# Patient Record
(demographics unavailable — no encounter records)

---

## 2018-12-18 NOTE — 2DMMODE
[place] [address]
Phone:  [phone] 2 D/M-MODE ECHOCARDIOGRAM     
_______________________________________________________________________________
 
Name:         VANI SIFUENTES                Room:                     REG CLI
M.R.#:    [id number]     Account #:     [id number]  
Admission:    18    Attend Phys:   Prosper Bustillo MD
Discharge:                Date of Birth: 41  
Date of Service: 18 1719  Report #:      [id number]
        [id number]
_______________________________________________________________________________
THIS REPORT FOR:  //name//                      
 
 
--------------- APPROVED REPORT --------------
 
 
Study performed:  2018 08:03:32
 
EXAM: Comprehensive 2D, Doppler, and color-flow 
Echocardiogram 
Patient Location: Out-Patient   
      Status:  routine
 
     BSA:         2.09
HR: 78 bpm BP:          92/69 mmHg 
 
Other Information 
Study Quality: Good
 
Indications
Murmur
Aortic valve replacement
 
2D Dimensions
IVSd:  13.62 (7-11mm) LVOT Diam:  20.36 (18-24mm) 
LVDd:  48.24 mm  
PWd:  13.69 (7-11mm) Ascending Ao:  34.48 (22-36mm)
LVDs:  34.39 (25-40mm) 
Aortic Root:  23.25 mm 
 
Volumes
Left Atrial Volume (Systole) 
    LA ESV Index:  30.60 mL/m2
 
Aortic Valve
AoV Peak Ld.:  1.95 m/s 
AO Peak Gr.:  15.17 mmHg LVOT Max P.81 mmHg
AO Mean Gr.:  8.51 mmHg  LVOT Mean P.05 mmHg
    LVOT Max V:  1.10 m/s
AO V2 VTI:  37.71 cm  LVOT Mean V:  0.64 m/s
RON (VTI):  1.89 cm2  LVOT V1 VTI:  21.84 cm
 
Mitral Valve
MV Peak Gr.:  13.09 mmHg 
MV Mean Gr.:  6.39 mmHg  E/A Ratio:  1.13
    MV Decel. Time:  310.79 ms
 
 
[place] [address]
Phone:  [phone]                     2 D/M-MODE ECHOCARDIOGRAM     
_______________________________________________________________________________
 
Name:         VANI SIFUENTES                Room:                     REG CLI
M.R.#:    [id number]     Account #:     [id number]  
Admission:    18    Attend Phys:   Prosper Bustillo MD
Discharge:                Date of Birth: 41  
Date of Service: 18 1719  Report #:      [id number]
        [id number]
_______________________________________________________________________________
MV E Max Ld.:  1.33 m/s 
MV PHT:  90.13 ms  
MVA (PHT):  2.44 cm2  
 
TDI
E/Lateral E':  26.60 E/Medial E':  33.25
   Medial E' Ld.:  0.04 m/s
   Lateral E' Ld.:  0.05 m/s
 
Pulmonary Valve
PV Peak Ld.:  1.08 m/s PV Peak Gr.:  4.66 mmHg
 
Tricuspid Valve
    RAP Estimate:  5.00 mmHg
TR Peak Gr.:  36.95 mmHg RVSP:  41.95 mmHg
    PA Pressure:  41.95 mmHg
 
Left Ventricle
The left ventricle is normal size. There is normal LV segmental wall 
motion. Mild concentric left ventricular hypertrophy. Left 
ventricular systolic function is normal. The left ventricular 
ejection fraction is within the normal range. LVEF is 50-55%. Grade I 
- abnormal relaxation pattern.
 
Right Ventricle
The right ventricle is normal size. The right ventricular systolic 
function is normal. Pacemaker lead is present in the right ventricle. 
 
Atria
The left atrium size is normal. Pacemaker lead is present in the 
right atrium.
 
Aortic Valve
Mild aortic valve sclerosis. Bioprosthetic aortic valve is present. 
No aortic regurgitation is present. No hemodynamically significant 
valvular aortic stenosis.
 
Mitral Valve
Severe mitral annular calcification. Mitral valve leaflets are 
thickened. Mild mitral regurgitation. No significant mitral valve 
stenosis.
 
Tricuspid Valve
The tricuspid valve is normal in structure. Mild to moderate 
tricuspid regurgitation.
 
 
 
[place] [address]
Phone:  [phone]                     2 D/M-MODE ECHOCARDIOGRAM     
_______________________________________________________________________________
 
Name:         VANI SIFUENTES                Room:                     REG CLI
M.R.#:    [id number]     Account #:     [id number]  
Admission:    18    Attend Phys:   Prosper Bustillo MD
Discharge:                Date of Birth: 41  
Date of Service: 18 1719  Report #:      [id number]
        [id number]
_______________________________________________________________________________
Pulmonic Valve
The pulmonary valve is normal in structure. There is no pulmonic 
valvular regurgitation.
 
Great Vessels
The aortic root is normal in size. IVC is normal in size and 
collapses &gt;50% with inspiration.
 
Pericardium
There is no pericardial effusion.
 
&lt;Conclusion&gt;
The left ventricle is normal size.
Mild concentric left ventricular hypertrophy.
Left ventricular systolic function is normal.
The left ventricular ejection fraction is within the normal 
range.
LVEF is 50-55%.
Grade I - abnormal relaxation pattern.
The right ventricle is normal size.
The left atrium size is normal.
Mild aortic valve sclerosis.
No aortic regurgitation is present.
No hemodynamically significant valvular aortic stenosis.
Severe mitral annular calcification.
Mitral valve leaflets are thickened.
Mild mitral regurgitation.
No significant mitral valve stenosis.
The tricuspid valve is normal in structure.
Mild to moderate tricuspid regurgitation.
IVC is normal in size and collapses &gt;50% with inspiration.
There is no pericardial effusion.
There is normal LV segmental wall motion.
Bioprosthetic aortic valve is present.
 
 
 
 
 
 
 
 
 
 
  <ELECTRONICALLY SIGNED>
                                           By: Fabian Saenz MD, FACC     
  18
D: 18   _____________________________________
T: 18   Fabian Saenz MD, FACC       /INF

## 2020-10-23 NOTE — EKG
[place] [address]
Phone:  [phone]                     ELECTROCARDIOGRAM REPORT      
_______________________________________________________________________________
 
Name:         VANI SIFUENTES                Room:                     REG ER 
M.R.#:    [id number]     Account #:     [id number]  
Admission:    10/23/20    Attend Phys:                     
Discharge:                Date of Birth: 41  
Date of Service: 10/23/20 1503  Report #:      [id number]
        [id number]
_______________________________________________________________________________
THIS REPORT FOR:  //name//                      
 
                         [facility] ED
                                       
Test Date:    2020-10-23               Test Time:    15:03:46
Pat Name:     VANI SIFUENTES             Department:   
Patient ID:   [id number]            Room:          
Gender:                               Technician:   [facility]
:          1941               Requested By: Mirela Espino
Order Number: [id number]    Theo MD:   Fabian Saenz
                                 Measurements
Intervals                              Axis          
Rate:         104                      P:            106
IA:           150                      QRS:          -77
QRSD:         166                      T:            85
QT:           392                                    
QTc:          516                                    
                           Interpretive Statements
Ventricular-paced complexes
No further analysis attempted due to paced rhythm
Baseline wander in lead(s) II,III,aVF
Compared to ECG 2017 11:26:04
Atrial-sensed ventricular-paced complex(es persist
Electronically Signed On 10- 16:47:23 CDT by Fabian Saenz
[email or web address]
 
 
 
 
 
 
 
 
 
 
 
 
 
 
 
 
 
 
 
  <ELECTRONICALLY SIGNED>
                                           By: Fabian Saenz MD, [facility]     
  10/23/20     1647
D: 10/23/20 1503   _____________________________________
T: 10/23/20 1503   Fabian Saenz MD, [facility]       /EPI

## 2020-10-28 NOTE — 2DMMODE
[place] [address]
Phone:  [phone] 2 D/M-MODE ECHOCARDIOGRAM     
_______________________________________________________________________________
 
Name:         VANI SIFUENTES                Room:          [address] [place] 
.R.#:    [id number]     Account #:     [id number]  
Admission:    10/23/20    Attend Phys:   Jazmín Vergara, 
Discharge:                Date of Birth: 41  
Date of Service: 10/28/20 1329  Report #:      [id number]
        [id number]
_______________________________________________________________________________
THIS REPORT FOR:
 
cc:  Michael Silvestre MD, Bruce D. MD Holkins,Fabian HERNÁNDEZ MD [facility]       
                                                                       ~
 
--------------- APPROVED REPORT --------------
 
 
Study performed:  10/28/2020 10:03:01
 
EXAM: Comprehensive 2D, Doppler, and color-flow 
Echocardiogram 
Patient Location: Bedside   
 
      BSA:         2.11
HR: 74 bpm BP:          167/83 mmHg 
 
Other Information 
Study Quality: Adequate
 
Indications
Congestive Heart Failure
Covid Positive
 
2D Dimensions
IVSd:  17.38 (7-11mm) LVOT Diam:  16.48 (18-24mm) 
LVDd:  54.31 mm  
PWd:  13.32 (7-11mm) Ascending Ao:  31.35 (22-36mm)
LVDs:  36.80 (25-40mm) 
Aortic Root:  33.67 mm 
 
Volumes
Left Atrial Volume (Systole) 
    LA ESV Index:  79.40 mL/m2
 
Aortic Valve
AoV Peak Ld.:  1.87 m/s 
AO Peak Gr.:  13.95 mmHg LVOT Max P.49 mmHg
AO Mean Gr.:  8.01 mmHg  LVOT Mean PG:  3.33 mmHg
    LVOT Max V:  1.27 m/s
AO V2 VTI:  35.13 cm  LVOT Mean V:  0.84 m/s
RON (VTI):  1.52 cm2  LVOT V1 VTI:  25.09 cm
 
Mitral Valve
 
 
[place] [address]
Phone:  [phone]                     2 D/M-MODE ECHOCARDIOGRAM     
_______________________________________________________________________________
 
Name:         VANI SIFUENTES                Room:          [address] [place] 
..#:    [id number]     Account #:     [id number]  
Admission:    10/23/20    Attend Phys:   Jazmín Vergara, 
Discharge:                Date of Birth: 41  
Date of Service: 10/28/20 1329  Report #:      [id number]
        [id number]
_______________________________________________________________________________
MV Mean Gr.:  4.90 mmHg  E/A Ratio:  1.39
    MV Decel. Time:  277.64 ms
MV E Max Ld.:  1.70 m/s 
MV PHT:  80.52 ms  
MVA (PHT):  2.73 cm2  
 
Pulmonary Valve
PV Peak Ld.:  1.01 m/s PV Peak Gr.:  4.07 mmHg
 
Tricuspid Valve
    RAP Estimate:  5.00 mmHg
TR Peak Gr.:  36.75 mmHg RVSP:  41.75 mmHg
    PA Pressure:  41.75 mmHg
 
Left Ventricle
The left ventricle is normal size. There is normal LV segmental wall 
motion. Mild concentric left ventricular hypertrophy. Left 
ventricular systolic function is normal. The left ventricular 
ejection fraction is within the normal range. LVEF is 60-65%. The 
left ventricular diastolic function is normal.
 
Right Ventricle
The right ventricle is normal size. The right ventricular systolic 
function is normal. Pacemaker lead is present in the right ventricle. 
 
Atria
Left atrium is moderately dilated. The right atrium size is 
normal.
 
Aortic Valve
Mild aortic valve sclerosis. Bioprosthetic aortic valve is present. 
No aortic regurgitation is present. No hemodynamically significant 
valvular aortic stenosis.
 
Mitral Valve
Severe mitral annular calcification. Trace mitral regurgitation. No 
evidence of mitral valve stenosis.
 
Tricuspid Valve
The tricuspid valve is normal in structure. Trace tricuspid 
regurgitation.
 
Pulmonic Valve
The pulmonary valve is normal in structure. There is no pulmonic 
valvular regurgitation.
 
 
 
[place] [address]
Phone:  [phone]                     2 D/M-MODE ECHOCARDIOGRAM     
_______________________________________________________________________________
 
Name:         VANI SIFUENTES                Room:          [address]#:    [id number]     Account #:     [id number]  
Admission:    10/23/20    Attend Phys:   Jazmín Vergara, 
Discharge:                Date of Birth: 41  
Date of Service: 10/28/20 1329  Report #:      [id number]
        [id number]
_______________________________________________________________________________
Great Vessels
The aortic root is normal in size. Aortic arch is not visualized. IVC 
is notl visualized.
 
Pericardium
There is no pericardial effusion.
 
<Conclusion>
The left ventricle is normal size.
Mild concentric left ventricular hypertrophy.
Left ventricular systolic function is normal.
The left ventricular ejection fraction is within the normal 
range.
LVEF is 60-65%.
The left ventricular diastolic function is normal.
The right ventricle is normal size.
Left atrium is moderately dilated.
Mild aortic valve sclerosis.
No aortic regurgitation is present.
No hemodynamically significant valvular aortic stenosis.
Severe mitral annular calcification.
Trace mitral regurgitation.
No evidence of mitral valve stenosis.
The tricuspid valve is normal in structure.
There is no pericardial effusion.
There is normal LV segmental wall motion.
Bioprosthetic aortic valve is present.
 
 
 
 
 
 
 
 
 
 
 
 
 
 
 
 
 
  <ELECTRONICALLY SIGNED>
                                           By: Fabian Saenz MD, FACC     
  10/28/20     1329
D: 10/28/20 1329   _____________________________________
T: 10/28/20 1329   Fabian Saenz MD, FACC       /INF

## 2021-05-31 NOTE — EKG
[place] [address]
Phone:  [phone]                     ELECTROCARDIOGRAM REPORT      
_______________________________________________________________________________
 
Name:         ESAU SIFUENTESCAMILA MAYO                Room:                     [facility]#:    [id number]     Account #:     [id number]  
Admission:    21    Attend Phys:                     
Discharge:    21    Date of Birth: 41  
Date of Service: 21 1511  Report #:      [id number]
        [id number]
_______________________________________________________________________________
THIS REPORT FOR:  //name//                      
 
                         [facility] ED
                                       
Test Date:    2021               Test Time:    15:11:05
Pat Name:     VANI SIFUENTES             Department:   
Patient ID:   [id number]            Room:          
Gender:                               Technician:   
:          1941               Requested By: Francis Rosario
Order Number: [id number] MD:   Prosper Bustillo
                                 Measurements
Intervals                              Axis          
Rate:         76                       P:            58
UT:           171                      QRS:          -80
QRSD:         166                      T:            90
QT:           431                                    
QTc:          485                                    
                           Interpretive Statements
Atrial-sensed ventricular-paced rhythm
No further analysis attempted due to paced rhythm
Baseline wander in lead(s) V1,V4
Compared to ECG 10/23/2020 15:03:46
No significant changes
Electronically Signed On 2021 10:55:58 CDT by Prosper Bustillo
[email or web address]
 
 
 
 
 
 
 
 
 
 
 
 
 
 
 
 
 
 
 
  <ELECTRONICALLY SIGNED>
                                           By: Prosper Bustillo MD, [facility]      
  21     1055
D: 21 151   _____________________________________
T: 21 151   Prosper Bustillo MD, [facility]        /EPI

## 2021-08-05 NOTE — EKG
[place] [address]
Phone:  [phone]                     ELECTROCARDIOGRAM REPORT      
_______________________________________________________________________________
 
Name:         ESAU SIFUENTESCAMILA MAYO                Room:                     [facility]#:    [id number]     Account #:     [id number]  
Admission:    21    Attend Phys:                     
Discharge:    21    Date of Birth: 41  
Date of Service: 21 0745  Report #:      [id number]
        [id number]
_______________________________________________________________________________
THIS REPORT FOR:  //name//                      
 
                         [facility] ED
                                       
Test Date:    2021               Test Time:    07:45:51
Pat Name:     VANI SIFUENTES             Department:   
Patient ID:   [id number]            Room:          
Gender:                               Technician:   AL
:          1941               Requested By: Francis Rosario
Order Number: [id number] MD:   Jos Gabriel
                                 Measurements
Intervals                              Axis          
Rate:         85                       P:            85
MT:           164                      QRS:          -82
QRSD:         165                      T:            83
QT:           438                                    
QTc:          521                                    
                           Interpretive Statements
Ventricular-paced rhythm
No further analysis attempted due to paced rhythm
Baseline wander in lead(s) V4,V5
Compared to ECG 2021 15:11:05
Atrial-sensed ventricular-paced complex(es) or rhythm no longer present
Electronically Signed On 2021 11:44:46 CDT by Jos Gabriel
[email or web address]
 
 
 
 
 
 
 
 
 
 
 
 
 
 
 
 
 
 
 
  <ELECTRONICALLY SIGNED>
                                           By: KT Gabriel MD, [facility]    
  21     1144
D: 21 0745   _____________________________________
T: 21 0745   KT Gabriel MD, [facility]      /EPI